# Patient Record
Sex: MALE | Race: WHITE | Employment: FULL TIME | URBAN - METROPOLITAN AREA
[De-identification: names, ages, dates, MRNs, and addresses within clinical notes are randomized per-mention and may not be internally consistent; named-entity substitution may affect disease eponyms.]

---

## 2019-09-04 ENCOUNTER — HOSPITAL ENCOUNTER (OUTPATIENT)
Age: 49
Discharge: HOME | End: 2019-09-04
Payer: COMMERCIAL

## 2019-09-04 ENCOUNTER — HOSPITAL ENCOUNTER (OUTPATIENT)
Dept: PET IMAGING | Age: 49
Discharge: HOME | End: 2019-09-04
Payer: COMMERCIAL

## 2019-09-04 VITALS — WEIGHT: 175 LBS | BODY MASS INDEX: 23.7 KG/M2 | HEIGHT: 72 IN

## 2019-09-04 DIAGNOSIS — C01 CANCER OF BASE OF TONGUE (HCC): ICD-10-CM

## 2019-09-04 PROCEDURE — 3430000000 HC RX DIAGNOSTIC RADIOPHARMACEUTICAL: Performed by: OTOLARYNGOLOGY

## 2019-09-04 PROCEDURE — A9552 F18 FDG: HCPCS | Performed by: OTOLARYNGOLOGY

## 2019-09-04 PROCEDURE — 78815 PET IMAGE W/CT SKULL-THIGH: CPT

## 2019-09-04 RX ORDER — FLUDEOXYGLUCOSE F 18 200 MCI/ML
13.68 INJECTION, SOLUTION INTRAVENOUS
Status: COMPLETED | OUTPATIENT
Start: 2019-09-04 | End: 2019-09-04

## 2019-09-04 RX ADMIN — FLUDEOXYGLUCOSE F 18 13.68 MILLICURIE: 200 INJECTION, SOLUTION INTRAVENOUS at 11:18

## 2019-12-31 ENCOUNTER — HOSPITAL ENCOUNTER (OUTPATIENT)
Dept: PHYSICAL THERAPY | Age: 49
Setting detail: THERAPIES SERIES
Discharge: HOME | End: 2019-12-31
Payer: COMMERCIAL

## 2019-12-31 PROCEDURE — 97161 PT EVAL LOW COMPLEX 20 MIN: CPT

## 2019-12-31 PROCEDURE — 97530 THERAPEUTIC ACTIVITIES: CPT

## 2019-12-31 NOTE — PROGRESS NOTES
Physical Therapy  Initial Assessment/ Discharge  Date: 2019  Patient Name: Karthik Rubio  MRN: [id number]  : 1970        Physical Therapy Discharge Summary    Dear Referring Practitioner: Dr. Hosea Kanner,    We had the pleasure of treating the following patient for physical therapy services at [address] [place] [address]. A summary of our findings can be found in the discharge summary below. If you have any questions or concerns regarding these findings, please do not hesitate to contact me at the office phone number checked above. Thank you for the referral.     Physician Signature:________________________________Date:__________________  By signing above (or electronic signature), therapists plan is approved by physician      Functional Outcome:  Pt will be I in MLD HEP and know the signs and symptoms of possible lymphedema  MET    Overall Response to Treatment:   [x]Patient is responding well to treatment and improvement is noted with regards  to goals   []Patient should continue to improve in reasonable time if they continue HEP   []Patient has plateaued and is no longer responding to skilled PT intervention    []Patient is getting worse and would benefit from return to referring MD   []Patient unable to adhere to initial POC   []Other:     Date range of Visits: 19  Total Visits: 1    Recommendation:    [x] Discharge to HEP. Follow up with PT PRN.      Restrictions    Outpatient fall risk assessment completed asking screening question if patient has fallen in the past 30 days:  [x] Yes  [] No    Based on screen for falls, patient demonstrates fall risk:  [] Yes  [x] No    Interventions based on fall risk status:  Updated Problem List within Medical History  [] Yes   [x] N/A    Asked family to assist with increased observation of the patient  [] Yes   [x] N/A    Patient kept in visible area when not closely supervised by therapist  [] Yes   [x] N/A    Repeatedly reinforce activity limits and safety needs with patient/family  [] Yes   [x] N/A    Increase frequency of rounding/monitoring patient  [] Yes   [x] N/A        Subjective   General  Chart Reviewed: Yes  Referring Practitioner: Dr. Amanda Rios  Referral Date : 12/12/19  Diagnosis: submandibular lymphedema  Follows Commands: Within Functional Limits  PT Visit Information  Onset Date: 09/16/19  PT Insurance Information: luis  Total # of Visits Approved: 12  Subjective  Subjective: Pt reports that the MD is concerned about the possiblity of swelling. Has had radiation treatments and is still healing from these. Does not feel he has any swelling. Pain Screening  Patient Currently in Pain: No  Vital Signs  Patient Currently in Pain: No    Vision/Hearing  Vision  Vision: Within Functional Limits  Hearing  Hearing: Within functional limits    Orientation  Orientation  Overall Orientation Status: Within Normal Limits    Social/Functional History  Social/Functional History  Lives With: Spouse  Type of Home: House  ADL Assistance: Independent  Homemaking Assistance: Independent  Ambulation Assistance: Independent  Transfer Assistance: Independent  Active : Yes    Objective  Observation/Palpation  Posture: Fair  Observation: moving jaw and mouth very stiffly    AROM RUE (degrees)  RUE AROM : WNL  AROM LUE (degrees)  LUE AROM : WNL    Strength RUE  Strength RUE: WNL  Strength LUE  Strength LUE: WNL  Additional Measures  Girth: submandibular 17Cm, high neck: 38.7 cm, low neck 38.6 cm  Special Tests: no edema present at this time    Assessment   Conditions Requiring Skilled Therapeutic Intervention  Assessment: Pt taught HEP MLD to prevent lymphedema and pt voiced knowledge of techniques.  D/c to home  Decision Making: Low Complexity  REQUIRES PT FOLLOW UP: No  Activity Tolerance  Activity Tolerance: Patient Tolerated treatment well         Plan   Plan  Specific instructions for Next Treatment: discharge      Goals  Long term